# Patient Record
Sex: FEMALE | Race: WHITE | NOT HISPANIC OR LATINO | Employment: OTHER | URBAN - METROPOLITAN AREA
[De-identification: names, ages, dates, MRNs, and addresses within clinical notes are randomized per-mention and may not be internally consistent; named-entity substitution may affect disease eponyms.]

---

## 2013-06-12 LAB — HM PAP SMEAR: NEGATIVE

## 2015-03-16 LAB
HM DXA SCAN: NORMAL
HM MAMMOGRAM: NEGATIVE

## 2017-05-18 DIAGNOSIS — E04.2 MULTIPLE THYROID NODULES: Primary | ICD-10-CM

## 2017-05-18 DIAGNOSIS — E55.9 AVITAMINOSIS D: ICD-10-CM

## 2017-05-18 DIAGNOSIS — E04.2 GOITER, NONTOXIC, MULTINODULAR: ICD-10-CM

## 2017-05-18 DIAGNOSIS — E78.5 DYSLIPIDEMIA: ICD-10-CM

## 2017-06-06 ENCOUNTER — LAB (OUTPATIENT)
Dept: ENDOCRINOLOGY | Age: 77
End: 2017-06-06

## 2017-06-06 DIAGNOSIS — E04.2 GOITER, NONTOXIC, MULTINODULAR: ICD-10-CM

## 2017-06-06 DIAGNOSIS — E55.9 AVITAMINOSIS D: ICD-10-CM

## 2017-06-06 DIAGNOSIS — E78.5 DYSLIPIDEMIA: ICD-10-CM

## 2017-06-06 DIAGNOSIS — R53.82 CHRONIC FATIGUE: ICD-10-CM

## 2017-06-06 DIAGNOSIS — E04.2 MULTIPLE THYROID NODULES: ICD-10-CM

## 2017-06-26 ENCOUNTER — TELEPHONE (OUTPATIENT)
Dept: ENDOCRINOLOGY | Age: 77
End: 2017-06-26

## 2017-06-26 NOTE — TELEPHONE ENCOUNTER
----- Message from ALLIE Ramirez sent at 6/26/2017 10:39 AM EDT -----  Contact: PATIENT  i will not see her due to the cancellations.  ----- Message -----     From: Karmen Herrera MA     Sent: 6/26/2017  10:18 AM       To: ALLIE Ramirez        ----- Message -----     From: Diane Casper     Sent: 6/26/2017  10:15 AM       To: Karmen Herrera MA    THE PATIENT CANCELLED HER APPT. FOR TODAY AND THIS IS THE SECOND TIME THAT SHE HAS CANCELLED SAME DAY. SHE HAS CANCELLED ALL OF THE APPT. SHE HAS MADE SINCE November OF LAST YEAR. SHE IS UPSET THAT SHE CAN'T GET IN FOR A WHILE BUT SHE IS CONSISTENTLY CANCELLING HER APPT'S. PLEASE ADVISE ME ON WHAT TO DO.

## 2017-10-06 DIAGNOSIS — R53.82 CHRONIC FATIGUE: ICD-10-CM

## 2017-10-06 DIAGNOSIS — E04.2 GOITER, NONTOXIC, MULTINODULAR: ICD-10-CM

## 2017-10-06 DIAGNOSIS — E55.9 AVITAMINOSIS D: ICD-10-CM

## 2017-10-06 RX ORDER — ERGOCALCIFEROL 1.25 MG/1
CAPSULE ORAL
Qty: 13 CAPSULE | Refills: 3 | Status: CANCELLED | OUTPATIENT
Start: 2017-10-06

## 2017-10-09 DIAGNOSIS — E04.2 GOITER, NONTOXIC, MULTINODULAR: ICD-10-CM

## 2017-10-09 DIAGNOSIS — E55.9 AVITAMINOSIS D: ICD-10-CM

## 2017-10-09 DIAGNOSIS — R53.82 CHRONIC FATIGUE: ICD-10-CM

## 2017-10-09 RX ORDER — ERGOCALCIFEROL 1.25 MG/1
50000 CAPSULE ORAL
Qty: 13 CAPSULE | Refills: 0 | Status: SHIPPED | OUTPATIENT
Start: 2017-10-09 | End: 2017-11-17 | Stop reason: SDUPTHER

## 2017-11-17 ENCOUNTER — OFFICE VISIT (OUTPATIENT)
Dept: ENDOCRINOLOGY | Age: 77
End: 2017-11-17

## 2017-11-17 VITALS — DIASTOLIC BLOOD PRESSURE: 60 MMHG | SYSTOLIC BLOOD PRESSURE: 140 MMHG | HEIGHT: 60 IN

## 2017-11-17 DIAGNOSIS — R53.82 CHRONIC FATIGUE: ICD-10-CM

## 2017-11-17 DIAGNOSIS — E04.2 GOITER, NONTOXIC, MULTINODULAR: Primary | ICD-10-CM

## 2017-11-17 DIAGNOSIS — E55.9 AVITAMINOSIS D: ICD-10-CM

## 2017-11-17 DIAGNOSIS — E04.2 MULTIPLE THYROID NODULES: ICD-10-CM

## 2017-11-17 DIAGNOSIS — E78.5 DYSLIPIDEMIA: ICD-10-CM

## 2017-11-17 PROCEDURE — 99214 OFFICE O/P EST MOD 30 MIN: CPT | Performed by: NURSE PRACTITIONER

## 2017-11-17 RX ORDER — ERGOCALCIFEROL 1.25 MG/1
50000 CAPSULE ORAL
Qty: 13 CAPSULE | Refills: 1 | Status: SHIPPED | OUTPATIENT
Start: 2017-11-17 | End: 2018-06-11 | Stop reason: SDUPTHER

## 2017-11-17 NOTE — PROGRESS NOTES
"Subjective   Kierra Garcia is a 76 y.o. female is here today for follow-up.  Chief Complaint   Patient presents with   • Goiter     recent labs being faxed from Dr. Kehrer office   • Vitamin D Deficiency   • Fatigue     /60  Ht 60\" (152.4 cm)  Current Outpatient Prescriptions on File Prior to Visit   Medication Sig   • alendronate (FOSAMAX) 70 MG tablet TAKE 1 TABLET ONE TIME WEEKLY   • calcium carbonate (OS-ASUNCION) 600 MG tablet Take 600 mg by mouth daily.   • glucosamine-chondroitin 500-400 MG capsule capsule Take 1 capsule by mouth 2 (two) times a day.   • Multiple Vitamins-Minerals (CENTRUM SILVER ADULT 50+) tablet Take 1 tablet by mouth daily.   • traMADol (ULTRAM) 50 MG tablet Take 50 mg by mouth. 1/2 tablet every 24 hours   • vitamin D (ERGOCALCIFEROL) 33560 units capsule capsule Take 1 capsule by mouth Every 7 (Seven) Days.   • ZYRTEC ALLERGY 10 MG tablet Take 10 mg by mouth daily.   • Simethicone (GAS RELIEF PO) Take 1 tablet by mouth daily as needed.   • [DISCONTINUED] diclofenac (VOLTAREN) 75 MG EC tablet Take 75 mg by mouth daily.   • [DISCONTINUED] Zolpidem Tartrate 5 MG sublingual tablet Place 5 mg under the tongue at night as needed.     No current facility-administered medications on file prior to visit.      Family History   Problem Relation Age of Onset   • Thyroid disease Mother    • Heart disease Mother    • Alzheimer's disease Mother      Social History   Substance Use Topics   • Smoking status: Never Smoker   • Smokeless tobacco: None   • Alcohol use No     No Known Allergies      History of Present Illness  Encounter Diagnoses   Name Primary?   • Goiter, nontoxic, multinodular Yes   • Multiple thyroid nodules    • Dyslipidemia    • Chronic fatigue    • Avitaminosis D    This is a 76-year-old female patient here today for her yearly follow-up on thyroid.  She has some recent labs done her primary care provider which were requested and reviewed.  Her thyroid hormones are an satisfactory " range.  Her vitamin D was upper limits of normal.  She has been taking weekly prescription strength vitamin D along with the daily over-the-counter.  She states she was recently started on metoprolol but she does not recall the dose.  She is taking it once daily.  She was diagnosed with atrial fibrillation was also put on Eliquis.  She had a go done that was normal.  She is seeing a neurologist for balance problems and had an MRI of the brain and MRA.  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  She does have a history of thyroid nodules that are benign.  Her thyroid ultrasound and biopsy were reviewed from a year ago.         The following portions of the patient's history were reviewed and updated as appropriate: allergies, current medications, past family history, past medical history, past social history, past surgical history and problem list.    Review of Systems   Constitutional: Positive for fatigue.   HENT: Negative for trouble swallowing.    Eyes: Negative for visual disturbance.   Respiratory: Negative for shortness of breath.    Cardiovascular: Negative for leg swelling.   Endocrine: Negative for polyuria.   Skin: Negative for wound.   Neurological: Negative for numbness.       Objective   Physical Exam   Constitutional: She is oriented to person, place, and time. She appears well-developed and well-nourished. No distress.   HENT:   Head: Normocephalic and atraumatic.   Right Ear: External ear normal.   Left Ear: External ear normal.   Nose: Nose normal.   Mouth/Throat: Oropharynx is clear and moist. No oropharyngeal exudate.   Eyes: EOM are normal. Pupils are equal, round, and reactive to light. Right eye exhibits no discharge. Left eye exhibits no discharge.   Neck: Trachea normal, normal range of motion and full passive range of motion without pain. Neck supple. No tracheal tenderness present. Carotid bruit is not present. No tracheal deviation,  no edema and no erythema present. No thyroid mass and no thyromegaly present.   Cardiovascular: Normal rate, regular rhythm, normal heart sounds and intact distal pulses.  Exam reveals no gallop and no friction rub.    No murmur heard.  Pulmonary/Chest: Effort normal and breath sounds normal. No stridor. No respiratory distress. She has no wheezes. She has no rales.   Abdominal: Soft. Bowel sounds are normal. She exhibits no distension.   Musculoskeletal: Normal range of motion. She exhibits no edema or deformity.   Lymphadenopathy:     She has no cervical adenopathy.   Neurological: She is alert and oriented to person, place, and time.   Skin: Skin is warm and dry. No rash noted. She is not diaphoretic. No erythema. No pallor.   Psychiatric: She has a normal mood and affect. Her behavior is normal. Judgment and thought content normal.   Nursing note and vitals reviewed.        Assessment/Plan   Problems Addressed this Visit        Digestive    Avitaminosis D       Endocrine    Goiter, nontoxic, multinodular - Primary    Multiple thyroid nodules       Other    Dyslipidemia    Fatigue          In summary, patient was seen and examined.  She will continue all her current medications as prescribed.  I have advised her to stop taking her daily over-the-counter vitamin D and instead just take her weekly prescription vitamin D therapy.  We will continue to monitor her thyroid nodules periodically.  She had a thyroid ultrasound and biopsy done one year ago which was benign.  Her thyroid labs from her family medical practice 1 normal range.  She will follow-up in one year for routine thyroid ultrasound.  Metabolically she is stable.  Her blood pressure is slightly elevated at today's visit.  I have advised her to start monitoring her blood pressure.  She is now on metoprolol for atrial fib

## 2018-01-13 DIAGNOSIS — R53.82 CHRONIC FATIGUE: ICD-10-CM

## 2018-01-13 DIAGNOSIS — E55.9 AVITAMINOSIS D: ICD-10-CM

## 2018-01-13 DIAGNOSIS — E04.2 GOITER, NONTOXIC, MULTINODULAR: ICD-10-CM

## 2018-01-15 RX ORDER — ERGOCALCIFEROL 1.25 MG/1
CAPSULE ORAL
Qty: 13 CAPSULE | Refills: 0 | Status: SHIPPED | OUTPATIENT
Start: 2018-01-15 | End: 2018-06-11 | Stop reason: SDUPTHER

## 2018-06-11 DIAGNOSIS — R53.82 CHRONIC FATIGUE: ICD-10-CM

## 2018-06-11 DIAGNOSIS — E55.9 AVITAMINOSIS D: ICD-10-CM

## 2018-06-11 DIAGNOSIS — E04.2 GOITER, NONTOXIC, MULTINODULAR: ICD-10-CM

## 2018-06-11 RX ORDER — ERGOCALCIFEROL 1.25 MG/1
50000 CAPSULE ORAL
Qty: 13 CAPSULE | Refills: 0 | Status: SHIPPED | OUTPATIENT
Start: 2018-06-11 | End: 2018-09-07 | Stop reason: SDUPTHER

## 2018-09-07 DIAGNOSIS — R53.82 CHRONIC FATIGUE: ICD-10-CM

## 2018-09-07 DIAGNOSIS — E04.2 GOITER, NONTOXIC, MULTINODULAR: ICD-10-CM

## 2018-09-07 DIAGNOSIS — E55.9 AVITAMINOSIS D: ICD-10-CM

## 2018-09-07 RX ORDER — ERGOCALCIFEROL 1.25 MG/1
50000 CAPSULE ORAL
Qty: 13 CAPSULE | Refills: 0 | Status: SHIPPED | OUTPATIENT
Start: 2018-09-07

## 2018-12-09 DIAGNOSIS — R53.82 CHRONIC FATIGUE: ICD-10-CM

## 2018-12-09 DIAGNOSIS — E04.2 GOITER, NONTOXIC, MULTINODULAR: ICD-10-CM

## 2018-12-09 DIAGNOSIS — E55.9 AVITAMINOSIS D: ICD-10-CM

## 2018-12-10 RX ORDER — ERGOCALCIFEROL 1.25 MG/1
50000 CAPSULE ORAL
Qty: 13 CAPSULE | Refills: 0 | OUTPATIENT
Start: 2018-12-10

## 2018-12-20 ENCOUNTER — TELEPHONE (OUTPATIENT)
Dept: ENDOCRINOLOGY | Age: 78
End: 2018-12-20

## 2018-12-20 NOTE — TELEPHONE ENCOUNTER
----- Message from Mary Stoll sent at 12/19/2018  4:03 PM EST -----  Contact: patient  Patient said she is spending the winter with her daughter in [place]. She is requesting Vitamin D2 - 1.25mg, (28574 units), 1 capsule every 7 days to be sent to [facility], [phone]    She said she gets 13 each refill. [facility] told her they cannot contact us and that she needed.    Pt cell - [phone]            Refill denied. Pt will need to be seen in the office for refills, has not been seen since 2017

## 2018-12-28 ENCOUNTER — TELEPHONE (OUTPATIENT)
Dept: ENDOCRINOLOGY | Age: 78
End: 2018-12-28

## 2018-12-31 DIAGNOSIS — R53.82 CHRONIC FATIGUE: ICD-10-CM

## 2018-12-31 DIAGNOSIS — E55.9 AVITAMINOSIS D: ICD-10-CM

## 2018-12-31 DIAGNOSIS — E04.2 GOITER, NONTOXIC, MULTINODULAR: ICD-10-CM

## 2019-01-02 RX ORDER — ERGOCALCIFEROL 1.25 MG/1
50000 CAPSULE ORAL
Qty: 13 CAPSULE | Refills: 0 | OUTPATIENT
Start: 2019-01-02

## 2019-01-08 ENCOUNTER — TELEPHONE (OUTPATIENT)
Dept: ENDOCRINOLOGY | Age: 79
End: 2019-01-08

## 2019-01-08 NOTE — TELEPHONE ENCOUNTER
Pt has to be seen before refills will be given    ----- Message from Isha Frost sent at 1/8/2019 10:18 AM EST -----  Vitamin d 2 1.25 mg    She is out of town for several months    Please reach out to [facility] in [place]  Phone 1     Please send presdcription to [facility]

## 2019-02-05 ENCOUNTER — TELEPHONE (OUTPATIENT)
Dept: ENDOCRINOLOGY | Age: 79
End: 2019-02-05

## 2019-02-05 NOTE — TELEPHONE ENCOUNTER
----- Message from ALLIE Ramirez sent at 2/5/2019 11:17 AM EST -----  Contact: [phone]  Denied until seen with labs  ----- Message -----  From: Karmen Herrera MA  Sent: 2/5/2019  11:04 AM  To: ALLIE Ramirez    Please advise  ----- Message -----  From: Isha Frost  Sent: 2/5/2019  10:50 AM  To: Karmen Herrera MA    Vitamin d 2    Trying to get a script for vitamin D (ERGOCALCIFEROL) 77678 units capsule capsule 13 capsule     [facility] a [facility]    Patient hasnt been seen since 2017 but has an upcoming appt in April  Can she have a 30 day supply